# Patient Record
Sex: FEMALE
[De-identification: names, ages, dates, MRNs, and addresses within clinical notes are randomized per-mention and may not be internally consistent; named-entity substitution may affect disease eponyms.]

---

## 2023-06-16 ENCOUNTER — DASHBOARD ENCOUNTERS (OUTPATIENT)
Age: 73
End: 2023-06-16

## 2023-06-16 ENCOUNTER — LAB OUTSIDE AN ENCOUNTER (OUTPATIENT)
Dept: URBAN - METROPOLITAN AREA CLINIC 82 | Facility: CLINIC | Age: 73
End: 2023-06-16

## 2023-06-16 ENCOUNTER — OFFICE VISIT (OUTPATIENT)
Dept: URBAN - METROPOLITAN AREA CLINIC 82 | Facility: CLINIC | Age: 73
End: 2023-06-16
Payer: MEDICARE

## 2023-06-16 DIAGNOSIS — R10.32 LLQ CRAMPING: ICD-10-CM

## 2023-06-16 DIAGNOSIS — R19.4 CHANGE IN BOWEL HABIT: ICD-10-CM

## 2023-06-16 PROBLEM — 88111009: Status: ACTIVE | Noted: 2023-06-16

## 2023-06-16 PROCEDURE — 99203 OFFICE O/P NEW LOW 30 MIN: CPT | Performed by: INTERNAL MEDICINE

## 2023-06-16 RX ORDER — POLYETHYLENE GLYCOL 3350, SODIUM SULFATE ANHYDROUS, SODIUM BICARBONATE, SODIUM CHLORIDE, POTASSIUM CHLORIDE 236; 22.74; 6.74; 5.86; 2.97 G/4L; G/4L; G/4L; G/4L; G/4L
AS DIRECTED POWDER, FOR SOLUTION ORAL ONCE
Qty: 1 GALLON | Refills: 0 | OUTPATIENT
Start: 2023-06-16 | End: 2023-06-17

## 2023-06-16 NOTE — HPI-TODAY'S VISIT:
Kayley Menard is a 72-year-old female came along with her daughter for complaints of having left lower quadrant abdominal pain intermittent.  Patient states she has a history of constipation has been going on for the past several months.  Patient reports having an unremarkable colonoscopy approximately 8 to 9 years ago.  Patient reports left lower quadrant abdominal discomfort also noticing pain and discomfort improves after she defecates.  Patient also reports having history of chronic acid reflux symptoms of reflux and frequent emesis.  Also reports occasional dysphagia but not frequently .  Patient denies any chest pain shortness of breath.  Patient reports constipation has been more regular in the past few weeks.  Denies any unintentional weight loss.  Patient denies any chest pain shortness of breath translation was given by her daughter.  Today she is accompanied by her daughter and her .

## 2023-06-16 NOTE — PHYSICAL EXAM GASTROINTESTINAL
Abdomen ,obese  soft, nontender, nondistended , no guarding or rigidity , no masses palpable , normal bowel sounds , Liver and Spleen , no hepatomegaly present , no hepatosplenomegaly , liver nontender , spleen not palpable

## 2023-06-16 NOTE — PHYSICAL EXAM NEUROLOGIC:
oriented to person, place and time , normal sensation , short and long term memory intact Detail Level: Detailed

## 2023-07-05 ENCOUNTER — CLAIMS CREATED FROM THE CLAIM WINDOW (OUTPATIENT)
Dept: URBAN - METROPOLITAN AREA CLINIC 4 | Facility: CLINIC | Age: 73
End: 2023-07-05
Payer: MEDICARE

## 2023-07-05 ENCOUNTER — OFFICE VISIT (OUTPATIENT)
Dept: URBAN - METROPOLITAN AREA SURGERY CENTER 13 | Facility: SURGERY CENTER | Age: 73
End: 2023-07-05
Payer: MEDICARE

## 2023-07-05 DIAGNOSIS — D12.3 POLYP OF TRANSVERSE COLON: ICD-10-CM

## 2023-07-05 DIAGNOSIS — K63.89 OTHER SPECIFIED DISEASES OF INTESTINE: ICD-10-CM

## 2023-07-05 DIAGNOSIS — R19.4 CHANGE IN BOWEL HABIT: ICD-10-CM

## 2023-07-05 PROCEDURE — 45385 COLONOSCOPY W/LESION REMOVAL: CPT | Performed by: INTERNAL MEDICINE

## 2023-07-05 PROCEDURE — G8907 PT DOC NO EVENTS ON DISCHARG: HCPCS | Performed by: INTERNAL MEDICINE

## 2023-07-05 PROCEDURE — 88305 TISSUE EXAM BY PATHOLOGIST: CPT | Performed by: PATHOLOGY

## 2023-07-07 ENCOUNTER — OFFICE VISIT (OUTPATIENT)
Dept: URBAN - METROPOLITAN AREA CLINIC 81 | Facility: CLINIC | Age: 73
End: 2023-07-07

## 2023-07-21 ENCOUNTER — OFFICE VISIT (OUTPATIENT)
Dept: URBAN - METROPOLITAN AREA CLINIC 81 | Facility: CLINIC | Age: 73
End: 2023-07-21
Payer: MEDICARE

## 2023-07-21 DIAGNOSIS — R10.32 LLQ CRAMPING: ICD-10-CM

## 2023-07-21 PROCEDURE — 76856 US EXAM PELVIC COMPLETE: CPT | Performed by: INTERNAL MEDICINE

## 2025-05-06 ENCOUNTER — LAB OUTSIDE AN ENCOUNTER (OUTPATIENT)
Dept: URBAN - METROPOLITAN AREA CLINIC 115 | Facility: CLINIC | Age: 75
End: 2025-05-06

## 2025-05-06 ENCOUNTER — DASHBOARD ENCOUNTERS (OUTPATIENT)
Age: 75
End: 2025-05-06

## 2025-05-06 ENCOUNTER — OFFICE VISIT (OUTPATIENT)
Dept: URBAN - METROPOLITAN AREA CLINIC 115 | Facility: CLINIC | Age: 75
End: 2025-05-06
Payer: MEDICARE

## 2025-05-06 DIAGNOSIS — Z86.0101 PERSONAL HISTORY OF ADENOMATOUS AND SERRATED COLON POLYPS: ICD-10-CM

## 2025-05-06 DIAGNOSIS — E66.811 OBESITY, CLASS I, BMI 30-34.9: ICD-10-CM

## 2025-05-06 DIAGNOSIS — K86.2 PANCREAS CYST: ICD-10-CM

## 2025-05-06 DIAGNOSIS — K42.9 REDUCIBLE UMBILICAL HERNIA: ICD-10-CM

## 2025-05-06 PROBLEM — 443371000124107: Status: ACTIVE | Noted: 2025-05-06

## 2025-05-06 PROBLEM — 301008003: Status: ACTIVE | Noted: 2025-05-06

## 2025-05-06 PROBLEM — 428283002: Status: ACTIVE | Noted: 2025-05-06

## 2025-05-06 PROBLEM — 31258000: Status: ACTIVE | Noted: 2025-05-06

## 2025-05-06 PROCEDURE — 99204 OFFICE O/P NEW MOD 45 MIN: CPT | Performed by: INTERNAL MEDICINE

## 2025-05-06 PROCEDURE — 99214 OFFICE O/P EST MOD 30 MIN: CPT | Performed by: INTERNAL MEDICINE

## 2025-05-06 RX ORDER — METFORMIN HYDROCHLORIDE 500 MG/1
1 TABLET WITH EVENING MEAL TABLET, EXTENDED RELEASE ORAL ONCE A DAY
Status: ACTIVE | COMMUNITY

## 2025-05-06 RX ORDER — ATENOLOL 25 MG/1
1 TABLET TABLET ORAL ONCE A DAY
Status: ACTIVE | COMMUNITY

## 2025-05-06 NOTE — HPI-TODAY'S VISIT:
Pleasant 75 y/o  female from [facility] with HTN,Diabetes,Obesity,colon polyp that came for eval given incidental finding on abdominal CT showing 2.0 pancreatic cyst with septations.Patient does not have alarm symptoms.No smoke.No family hx of pancreatic cancer.Fully independent.Came with son.Refer colonoscopy with polyps last year.On exam obese abdomen,no tender.No dicernible hernias.

## 2025-05-07 ENCOUNTER — TELEPHONE ENCOUNTER (OUTPATIENT)
Dept: URBAN - METROPOLITAN AREA CLINIC 115 | Facility: CLINIC | Age: 75
End: 2025-05-07

## 2025-05-07 LAB
CA 19-9: 9
CEA: <2

## 2025-05-08 ENCOUNTER — TELEPHONE ENCOUNTER (OUTPATIENT)
Dept: URBAN - METROPOLITAN AREA CLINIC 115 | Facility: CLINIC | Age: 75
End: 2025-05-08

## 2025-05-12 ENCOUNTER — TELEPHONE ENCOUNTER (OUTPATIENT)
Dept: URBAN - METROPOLITAN AREA CLINIC 115 | Facility: CLINIC | Age: 75
End: 2025-05-12

## 2025-05-15 ENCOUNTER — TELEPHONE ENCOUNTER (OUTPATIENT)
Dept: URBAN - METROPOLITAN AREA CLINIC 85 | Facility: CLINIC | Age: 75
End: 2025-05-15

## 2025-05-18 ENCOUNTER — WEB ENCOUNTER (OUTPATIENT)
Dept: URBAN - METROPOLITAN AREA CLINIC 115 | Facility: CLINIC | Age: 75
End: 2025-05-18

## 2025-05-18 RX ORDER — METFORMIN HYDROCHLORIDE 500 MG/1
1 TABLET WITH EVENING MEAL TABLET, EXTENDED RELEASE ORAL ONCE A DAY
Status: ACTIVE | COMMUNITY

## 2025-05-18 RX ORDER — ATENOLOL 25 MG/1
1 TABLET TABLET ORAL ONCE A DAY
Status: ACTIVE | COMMUNITY

## 2025-05-19 ENCOUNTER — TELEPHONE ENCOUNTER (OUTPATIENT)
Dept: URBAN - METROPOLITAN AREA CLINIC 115 | Facility: CLINIC | Age: 75
End: 2025-05-19

## 2025-05-20 ENCOUNTER — OFFICE VISIT (OUTPATIENT)
Dept: URBAN - METROPOLITAN AREA CLINIC 82 | Facility: CLINIC | Age: 75
End: 2025-05-20

## 2025-05-21 ENCOUNTER — OFFICE VISIT (OUTPATIENT)
Dept: URBAN - METROPOLITAN AREA MEDICAL CENTER 24 | Facility: MEDICAL CENTER | Age: 75
End: 2025-05-21

## 2025-05-21 RX ORDER — METFORMIN HYDROCHLORIDE 500 MG/1
1 TABLET WITH EVENING MEAL TABLET, EXTENDED RELEASE ORAL ONCE A DAY
Status: ACTIVE | COMMUNITY

## 2025-05-21 RX ORDER — ATENOLOL 25 MG/1
1 TABLET TABLET ORAL ONCE A DAY
Status: ACTIVE | COMMUNITY

## 2025-06-02 ENCOUNTER — TELEPHONE ENCOUNTER (OUTPATIENT)
Dept: URBAN - METROPOLITAN AREA CLINIC 115 | Facility: CLINIC | Age: 75
End: 2025-06-02

## 2025-06-05 ENCOUNTER — TELEPHONE ENCOUNTER (OUTPATIENT)
Dept: URBAN - METROPOLITAN AREA CLINIC 115 | Facility: CLINIC | Age: 75
End: 2025-06-05

## 2025-06-06 ENCOUNTER — TELEPHONE ENCOUNTER (OUTPATIENT)
Dept: URBAN - METROPOLITAN AREA CLINIC 85 | Facility: CLINIC | Age: 75
End: 2025-06-06